# Patient Record
Sex: FEMALE | Race: WHITE | Employment: OTHER | URBAN - METROPOLITAN AREA
[De-identification: names, ages, dates, MRNs, and addresses within clinical notes are randomized per-mention and may not be internally consistent; named-entity substitution may affect disease eponyms.]

---

## 2017-01-03 PROCEDURE — 87086 URINE CULTURE/COLONY COUNT: CPT | Performed by: INTERNAL MEDICINE

## 2017-01-12 ENCOUNTER — HOSPITAL ENCOUNTER (EMERGENCY)
Facility: HOSPITAL | Age: 82
Discharge: HOME | End: 2017-01-12
Attending: EMERGENCY MEDICINE
Payer: MEDICARE

## 2017-01-12 ENCOUNTER — APPOINTMENT (OUTPATIENT)
Dept: GENERAL RADIOLOGY | Facility: HOSPITAL | Age: 82
End: 2017-01-12
Attending: EMERGENCY MEDICINE
Payer: MEDICARE

## 2017-01-12 VITALS
HEART RATE: 56 BPM | TEMPERATURE: 98 F | WEIGHT: 170 LBS | BODY MASS INDEX: 28.32 KG/M2 | RESPIRATION RATE: 17 BRPM | HEIGHT: 65 IN | OXYGEN SATURATION: 97 %
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
## 2017-01-12 PROCEDURE — 99283 EMERGENCY DEPT VISIT LOW MDM: CPT

## 2017-01-12 PROCEDURE — 72170 X-RAY EXAM OF PELVIS: CPT

## 2017-01-12 NOTE — ED INITIAL ASSESSMENT (HPI)
Patient here for fall while caregiver was assisting patient from car to wheelchair. Both caregiver and patient slid down. Patient denies any pain. No head injury, no loc.

## 2017-04-18 ENCOUNTER — HOSPITAL ENCOUNTER (EMERGENCY)
Facility: HOSPITAL | Age: 82
Discharge: HOME | End: 2017-04-18
Attending: EMERGENCY MEDICINE
Payer: MEDICARE

## 2017-04-18 VITALS
DIASTOLIC BLOOD PRESSURE: 77 MMHG | HEART RATE: 63 BPM | HEIGHT: 64 IN | BODY MASS INDEX: 29.02 KG/M2 | SYSTOLIC BLOOD PRESSURE: 128 MMHG | RESPIRATION RATE: 16 BRPM | TEMPERATURE: 97 F | WEIGHT: 170 LBS | OXYGEN SATURATION: 97 %

## 2017-04-18 DIAGNOSIS — R63.0 ANOREXIA: ICD-10-CM

## 2017-04-18 DIAGNOSIS — F32.A DEPRESSION, UNSPECIFIED DEPRESSION TYPE: Primary | ICD-10-CM

## 2017-04-18 PROCEDURE — 80048 BASIC METABOLIC PNL TOTAL CA: CPT | Performed by: EMERGENCY MEDICINE

## 2017-04-18 PROCEDURE — 99285 EMERGENCY DEPT VISIT HI MDM: CPT

## 2017-04-18 PROCEDURE — 87086 URINE CULTURE/COLONY COUNT: CPT | Performed by: EMERGENCY MEDICINE

## 2017-04-18 PROCEDURE — 81001 URINALYSIS AUTO W/SCOPE: CPT | Performed by: EMERGENCY MEDICINE

## 2017-04-18 PROCEDURE — 36415 COLL VENOUS BLD VENIPUNCTURE: CPT

## 2017-04-18 PROCEDURE — 85025 COMPLETE CBC W/AUTO DIFF WBC: CPT | Performed by: EMERGENCY MEDICINE

## 2017-04-18 NOTE — ED PROVIDER NOTES
Patient Seen in: [facility] AND [facility] Emergency Department    History   Patient presents with:  Carmencita (psychiatric)    Stated Complaint: psych    HPI    Patient presents with power of  for her health care.   She has history of bipolar disorder as we daily.   nystatin-triamcinolone (MYCOLOG II) 463371-9.1 UNIT/GM-% Apply Externally Cream,  Apply 1 Application topically 2 (two) times daily as needed.    acetaminophen (TYLENOL) 325 MG Oral Tab,  Take 2 tablets by mouth every 6 (six) hours as needed for Pa auscultation bilaterally with good effort. No wheezes, ronchi, or rales. Abdomen:  Soft, non-tender, non-distended. No masses, no hepato-splenomegaly. Negative McBurney's point tenderness. Musculoskeletal:  Good muscle tone.   Skin:  Warm, dry, well pe Final result                 Please view results for these tests on the individual orders.    RAINBOW DRAW BLUE   RAINBOW DRAW GOLD   RAINBOW DRAW LAVENDER   RAINBOW DRAW LIGHT GREEN   RAINBOW DRAW DARK GREEN   RAINBOW DRAW LAVENDER TALL (BNP)   URINE CULTU

## 2017-04-18 NOTE — ED INITIAL ASSESSMENT (HPI)
Pt is refusing to take medications, eat, or take care of herself. Lives at home, has 24 hour caregiver at home, becomes agitated and will not comply with caregivers.

## 2017-04-18 NOTE — DISCHARGE PLANNING
Met with patient (sleeping) and POA at bedside regarding discharge planning. Patient has been living at home with 24 hour caregivers and has been declining at home.  POA states she was recently discharged from [facility] and was supposed to start PT/

## 2017-04-18 NOTE — ED NOTES
Pt becoming increasingly agitated, needing to be redirected multiple times, awaiting POA return and update from  Luis Angela

## 2017-04-19 NOTE — DISCHARGE PLANNING
Spoke with Noamn Arizmendi at [facility] and they are able to accept the patient tonight at [address] . RN to call [phone] to give report. Ambulance arranged due to patient's confused state, unable to sit unsupervised. [facility] ambulance arranged with ETA 30 minutes.  V

## 2017-04-19 NOTE — DISCHARGE PLANNING
[facility] insurance today due to not being able to obtain authorization.  Discussed with [facility] that [facility]  may be able to accept the patient under private pay until authorization can be obtained th

## 2017-04-29 ENCOUNTER — APPOINTMENT (OUTPATIENT)
Dept: CT IMAGING | Facility: HOSPITAL | Age: 82
DRG: 690 | End: 2017-04-29
Attending: EMERGENCY MEDICINE
Payer: MEDICARE

## 2017-04-29 ENCOUNTER — APPOINTMENT (OUTPATIENT)
Dept: GENERAL RADIOLOGY | Facility: HOSPITAL | Age: 82
DRG: 690 | End: 2017-04-29
Attending: EMERGENCY MEDICINE
Payer: MEDICARE

## 2017-04-29 ENCOUNTER — HOSPITAL ENCOUNTER (INPATIENT)
Facility: HOSPITAL | Age: 82
LOS: 6 days | Discharge: NURSING, REHAB OR LONG-TERM CARE | DRG: 690 | End: 2017-05-05
Attending: EMERGENCY MEDICINE | Admitting: INTERNAL MEDICINE
Payer: MEDICARE

## 2017-04-29 DIAGNOSIS — IMO0002 SELF-INFLICTED INJURY: ICD-10-CM

## 2017-04-29 DIAGNOSIS — F32.A DEPRESSION, UNSPECIFIED DEPRESSION TYPE: ICD-10-CM

## 2017-04-29 DIAGNOSIS — N39.0 URINARY TRACT INFECTION WITHOUT HEMATURIA, SITE UNSPECIFIED: Primary | ICD-10-CM

## 2017-04-29 PROCEDURE — 73030 X-RAY EXAM OF SHOULDER: CPT

## 2017-04-29 PROCEDURE — 70450 CT HEAD/BRAIN W/O DYE: CPT

## 2017-04-30 PROCEDURE — 90792 PSYCH DIAG EVAL W/MED SRVCS: CPT | Performed by: OTHER

## 2017-04-30 RX ORDER — CASTOR OIL AND BALSAM, PERU 788; 87 MG/G; MG/G
OINTMENT TOPICAL 2 TIMES DAILY PRN
Status: DISCONTINUED | OUTPATIENT
Start: 2017-04-30 | End: 2017-05-06

## 2017-04-30 RX ORDER — LAMOTRIGINE 25 MG/1
100 TABLET ORAL 2 TIMES DAILY
Status: DISCONTINUED | OUTPATIENT
Start: 2017-04-30 | End: 2017-05-06

## 2017-04-30 RX ORDER — SODIUM CHLORIDE 9 MG/ML
INJECTION, SOLUTION INTRAVENOUS CONTINUOUS
Status: DISCONTINUED | OUTPATIENT
Start: 2017-04-30 | End: 2017-05-02

## 2017-04-30 RX ORDER — CLONAZEPAM 0.5 MG/1
0.5 TABLET ORAL 3 TIMES DAILY PRN
Status: DISCONTINUED | OUTPATIENT
Start: 2017-04-30 | End: 2017-05-01

## 2017-04-30 RX ORDER — 0.9 % SODIUM CHLORIDE 0.9 %
VIAL (ML) INJECTION
Status: COMPLETED
Start: 2017-04-30 | End: 2017-04-30

## 2017-04-30 RX ORDER — ASPIRIN 81 MG/1
81 TABLET, CHEWABLE ORAL DAILY
Status: DISCONTINUED | OUTPATIENT
Start: 2017-04-30 | End: 2017-05-06

## 2017-04-30 RX ORDER — GABAPENTIN 300 MG/1
300 CAPSULE ORAL DAILY
Status: DISCONTINUED | OUTPATIENT
Start: 2017-04-30 | End: 2017-04-30

## 2017-04-30 RX ORDER — LISINOPRIL 40 MG/1
40 TABLET ORAL DAILY
Status: DISCONTINUED | OUTPATIENT
Start: 2017-04-30 | End: 2017-05-06

## 2017-04-30 RX ORDER — CLONAZEPAM 0.5 MG/1
0.5 TABLET ORAL 2 TIMES DAILY
Status: DISCONTINUED | OUTPATIENT
Start: 2017-04-30 | End: 2017-04-30

## 2017-04-30 RX ORDER — ENOXAPARIN SODIUM 100 MG/ML
40 INJECTION SUBCUTANEOUS DAILY
Status: DISCONTINUED | OUTPATIENT
Start: 2017-04-30 | End: 2017-05-06

## 2017-04-30 RX ORDER — GINSENG 100 MG
CAPSULE ORAL DAILY
Status: DISCONTINUED | OUTPATIENT
Start: 2017-04-30 | End: 2017-05-06

## 2017-04-30 RX ORDER — ACETAMINOPHEN 325 MG/1
650 TABLET ORAL EVERY 6 HOURS PRN
Status: DISCONTINUED | OUTPATIENT
Start: 2017-04-30 | End: 2017-05-06

## 2017-04-30 RX ORDER — RISPERIDONE 1 MG/1
1 TABLET, FILM COATED ORAL DAILY
Status: DISCONTINUED | OUTPATIENT
Start: 2017-04-30 | End: 2017-05-03

## 2017-04-30 RX ORDER — ATORVASTATIN CALCIUM 20 MG/1
10 TABLET, FILM COATED ORAL NIGHTLY
Status: DISCONTINUED | OUTPATIENT
Start: 2017-04-30 | End: 2017-05-06

## 2017-04-30 RX ORDER — PAROXETINE HYDROCHLORIDE 20 MG/1
10 TABLET, FILM COATED ORAL EVERY MORNING
Status: DISCONTINUED | OUTPATIENT
Start: 2017-04-30 | End: 2017-05-01

## 2017-04-30 RX ORDER — AMLODIPINE BESYLATE 5 MG/1
5 TABLET ORAL DAILY
Status: DISCONTINUED | OUTPATIENT
Start: 2017-04-30 | End: 2017-05-06

## 2017-04-30 NOTE — ED INITIAL ASSESSMENT (HPI)
Pt presents from [facility] for fall out of wheelchair approx 16:30. Pt with hematoma noted to right side of head. Per NH pt had been purposely trying to fall out, pt states \" I did it on purpose. \"   Baseline A&O x2.

## 2017-04-30 NOTE — DISCHARGE PLANNING
5/30/17  Discharge planning   Spoke with son Elyssa Bass [phone]. Pt was admitted from [facility] after fall from wheel chair. Son is requesting transfer to [facility] and is considering long term NH placement.   Referral made to [facility]

## 2017-04-30 NOTE — H&P
[address] Patient Status:  Inpatient    1928 MRN [id number]   Location [facility] 5SW/SE Attending Ludivina Bell MD   Hosp Day # 1 PCP Katalina Gamboa MD     Date:  2017 has a history of getting angry like this and has had outbursts in the past where she has tried injuring herself to get her way.     History     Past Medical History   Diagnosis Date   • Hyperlipidemia    • HTN (hypertension)    • Paroxysmal atrial fibrillat mouth daily. Indications: Nutritional supplement   aspirin 81 MG Oral Chew Tab Chew 81 mg by mouth daily.  Indications: Cardiac Health   LISINOPRIL 20 MG Oral Tab TAKE ONE TABLET BY MOUTH TWICE DAILY   Ciprofloxacin HCl 250 MG Oral Tab Take 1 tablet (250 mg 27 04/29/2017   * 04/29/2017   CA 9.4 04/29/2017   ALB 3.7 08/05/2016   ALKPHO 81 08/05/2016   BILT 0.3 08/05/2016   TP 7.1 08/05/2016   AST 12* 08/05/2016   ALT 19 08/05/2016   INR 2.09* 12/30/2013   PT 23.2* 12/30/2013   TSH 2.150 08/05/2016   ETO

## 2017-04-30 NOTE — ED PROVIDER NOTES
Patient Seen in: [facility] AND [facility] Emergency Department    History   Patient presents with:  Contusion (musculoskeletal)    Stated Complaint: fall, possible psych    HPI    71-year-old female with history of hypertension, hyperlipidemia, paroxysmal atria (MYCOLOG II) 403268-8.3 UNIT/GM-% Apply Externally Cream,  Apply 1 Application topically 2 (two) times daily as needed.    acetaminophen (TYLENOL) 325 MG Oral Tab,  Take 2 tablets by mouth every 6 (six) hours as needed for Pain.   lamoTRIgine (LAMICTAL) 150 external or internal trauma by exam.  Neurological: Speech normal.  Cranial nerves II through XII intact. No focal motor or sensory deficits to extremities ×4. Skin: No laceration or abrasions.   Musculoskeletal                Head: Contusion and tenderne CBC WITH DIFFERENTIAL WITH PLATELET    Narrative: The following orders were created for panel order CBC WITH DIFFERENTIAL WITH PLATELET.   Procedure                               Abnormality         Status                     ---------

## 2017-04-30 NOTE — PLAN OF CARE
Problem: Patient/Family Goals  Goal: Patient/Family Long Term Goal  Patient’s Long Term Goal: To be discharged to appropriate level of care  Interventions:  - SW involved  - Psych eval  - Follow up with MD as ordered  - See additional Care Plan goals for s Not Progressing    Problem: SAFETY ADULT - FALL  Goal: Free from fall injury  INTERVENTIONS:  - Assess pt frequently for physical needs  - Identify cognitive and physical deficits and behaviors that affect risk of falls.   - [place] fall precautions as in

## 2017-04-30 NOTE — PHYSICAL THERAPY NOTE
PHYSICAL THERAPY EVALUATION - INPATIENT     Room Number: 563/563-A  Evaluation Date: 4/30/2017  Type of Evaluation: Initial  Physician Order: PT Eval and Treat    Presenting Problem: UTI  Reason for Therapy: Mobility Dysfunction and Discharge Planning behavioral disturbance, unspecified dementia type    Paroxysmal atrial fibrillation (HCC)    Urinary tract infection without hematuria    Self-inflicted injury    Depression, unspecified depression type      Past Medical History  Past Medical History   Jyothi Fair -  Static Standing: Poor +  Dynamic Standing: Poor    ACTIVITY TOLERANCE  Room air    AM-PAC '6-Clicks' INPATIENT SHORT FORM - BASIC MOBILITY  How much difficulty does the patient currently have. ..  -   Turning over in bed (including adjusting bedclot

## 2017-05-01 PROCEDURE — 99233 SBSQ HOSP IP/OBS HIGH 50: CPT | Performed by: OTHER

## 2017-05-01 RX ORDER — PAROXETINE 10 MG/1
5 TABLET, FILM COATED ORAL EVERY MORNING
Status: DISCONTINUED | OUTPATIENT
Start: 2017-05-02 | End: 2017-05-03

## 2017-05-01 RX ORDER — CLONAZEPAM 0.5 MG/1
0.25 TABLET ORAL 3 TIMES DAILY PRN
Status: DISCONTINUED | OUTPATIENT
Start: 2017-05-01 | End: 2017-05-06

## 2017-05-01 RX ORDER — ARIPIPRAZOLE 2 MG/1
1 TABLET ORAL 2 TIMES DAILY
Status: DISCONTINUED | OUTPATIENT
Start: 2017-05-01 | End: 2017-05-03

## 2017-05-01 NOTE — PLAN OF CARE
Problem: Patient/Family Goals  Goal: Patient/Family Long Term Goal  Patient’s Long Term Goal: To be discharged to appropriate level of care  Interventions:  - SW involved  - Psych eval  - Follow up with MD as ordered  - See additional Care Plan goals for s development  - Assess and document skin integrity  - Assess and document dressing/incision, wound bed, drain sites and surrounding tissue  - Implement wound care per orders  - Initiate isolation precautions as appropriate  - Initiate Pressure Ulcer prevent rounds. Patient updated on plan of care.

## 2017-05-01 NOTE — PROGRESS NOTES
[facility]    Progress Note    Shivani Kirby Patient Status:  Inpatient    1928 MRN [id number]   Location [facility] 5SW/SE Attending Riki Lozada MD   Hosp Day # 2 PCP Jared Schwartz MD       Subjective:     Claudia Kingston 04/29/2017       Xr Shoulder, Complete (min 2 Views), Right (cpt=[id number])    4/30/2017  CONCLUSION:  1. No acute fracture. 2. 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 No major discrepancy with preliminary [facility] radiology report.         Ct

## 2017-05-01 NOTE — DISCHARGE PLANNING
Updated clinicals, including psych consult sent to [facility] admissions.     CARROLL ordoñez [id number]

## 2017-05-01 NOTE — CONSULTS
[facility]    Report of Consultation    Valeria Johnnylety Patient Status:  Inpatient    1928 MRN [id number]   Location [facility] 5SW/SE Attending Valerie Law MD   Hosp Day # 2 PCP Celio Romo MD     Date of Admi otherwise has been on Paxil, risperidone, Klonopin and lamotrigine. The patient today admitted that she has been exhibiting memory problem and she believed that she is currently in [address].   Patient otherwise had brain CT scan showing small vessel isc lamoTRIgine (LAMICTAL) tab 100 mg 100 mg Oral BID   PARoxetine HCl (PAXIL) tab 10 mg 10 mg Oral QAM   risperiDONE (RISPERDAL) tab 1 mg 1 mg Oral Daily   Atorvastatin Calcium (LIPITOR) tab 10 mg 10 mg Oral Nightly   lisinopril (PRINIVIL,ZESTRIL) tab 40 mg  04/29/2017   CREATSERUM 1.08 04/29/2017   BUN 18 04/29/2017    04/29/2017   K 4.0 04/29/2017    04/29/2017   CO2 27 04/29/2017   * 04/29/2017   CA 9.4 04/29/2017   ALB 3.7 08/05/2016   ALKPHO 81 08/05/2016   TP 7.1 08/05/2016 bipolar disorder and history of dementia. Patient also has a history of previous self-inflicted behavior. Patient admitted in 2013 because she made herself fell from wheelchair and broke her hip.   Patient clearly has been exhibiting instability in her em

## 2017-05-01 NOTE — PLAN OF CARE
Problem: Patient/Family Goals  Goal: Patient/Family Long Term Goal  Patient’s Long Term Goal: To be discharged to appropriate level of care  Interventions:  - SW involved  - Psych eval  - Follow up with MD as ordered  - See additional Care Plan goals for s Progressing    Problem: SAFETY ADULT - FALL  Goal: Free from fall injury  INTERVENTIONS:  - Assess pt frequently for physical needs  - Identify cognitive and physical deficits and behaviors that affect risk of falls.   - [place] fall precautions as indica

## 2017-05-01 NOTE — PAYOR COMM NOTE
Admit Orders     Start     Ordered    04/30/17 0009  Admit to inpatient Once -  ([address])   Once     Ordering Provider:  Alberto Forte MD   Question Answer Comment   Diagnosis Urinary tract infection without hematuria, site unspecified

## 2017-05-01 NOTE — PAYOR COMM NOTE
Attending Physician: Nuha Mireles MD    Review Type: ADMISSION   Reviewer:  Laurie Olvera       Date: May 1, 2017 - 2:08 PM  Payor: [address] [place] [address] Number: [id number]  Admit date: 4/29/2017  8:36 PM   Admitted from [facility]      HIP REPLACEMENT SURGERY      MASTECTOMY LEFT      EXPLORATORY OF ABDOMEN      TREAT ECTOPIC PREG,NON REMVAL         Medications :   SIMVASTATIN 20 MG Oral Tab,  TAKE 1 TABLET BY MOUTH AT BEDTIME   gabapentin 300 MG Oral Cap,  Take 1 caps 2046 18   Temp 04/29/17 2046 97.4 °F (36.3 °C)   Temp src 04/29/17 2046 Temporal   SpO2 04/29/17 2046 98 %   O2 Device 04/29/17 2046 None (Room air)       Current:/51 mmHg  Pulse 61  Temp(Src) 97.4 °F (36.3 °C) (Temporal)  Resp 18  Ht 165.1 cm (5' 5\ for the following:     Clarity Urine Cloudy (*)     Protein Urine 30  (*)     Blood Urine Moderate (*)     Nitrite Urine Positive (*)     Urobilinogen Urine 2.0 (*)     Leukocyte Esterase Urine Large (*)     WBC Urine 436 (*)     RBC URINE 65 (*)     Bacte unspecified  (primary encounter diagnosis)  Self-inflicted injury  Depression, unspecified depression type    Disposition:  Admit    Follow-up:  No follow-up provider specified.     Medications Prescribed:  Current Discharge Medication List        Present o and he reports she recently had her medications changed. He states that the pt demands her own private caregivers. He states the pt needs to be the center of attention. Jose M Prado has been the [facility] for the last 4 years.  He notes a history of mood swings over th MOUTH AT BEDTIME   gabapentin 300 MG Oral Cap Take 1 capsule (300 mg total) by mouth daily. PARoxetine HCl 10 MG Oral Tab Take 10 mg by mouth every morning. risperiDONE 1 MG Oral Tab Take 1 mg by mouth daily.    lisinopril 20 MG Oral Tab Take 40 mg by m (36.4 °C) (Oral)  Resp 18  Ht 5' 5\" (1.651 m)  Wt 171 lb 9.6 oz (77.837 kg)  BMI 28.56 kg/m2  SpO2 99%     GENERAL: Well-appearing, no apparent distress   SKIN: Multiple ulcerations of the bilateral lower extremities   HEENT: Atraumatic, normocephalic, th ceftriaxone for UTI  - cont risperidone, lamictal, and klonopin prn  - psych consulted for adjustment of antipsychotics   - spoke with Osmin EM. He would like pt to be discharged to a permanent nursing home.  She will be transferring care to new PCP,

## 2017-05-01 NOTE — OCCUPATIONAL THERAPY NOTE
OCCUPATIONAL THERAPY EVALUATION - INPATIENT     Room Number: 563/563-A  Evaluation Date: 5/1/2017  Type of Evaluation: Initial  Presenting Problem: UTI, fall from wheelchair.     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

## 2017-05-02 RX ORDER — AMLODIPINE BESYLATE 2.5 MG/1
2.5 TABLET ORAL ONCE
Status: COMPLETED | OUTPATIENT
Start: 2017-05-02 | End: 2017-05-02

## 2017-05-02 RX ORDER — FLUCONAZOLE 100 MG/1
150 TABLET ORAL ONCE
Status: COMPLETED | OUTPATIENT
Start: 2017-05-02 | End: 2017-05-02

## 2017-05-02 RX ORDER — CIPROFLOXACIN 500 MG/1
500 TABLET, FILM COATED ORAL EVERY 12 HOURS SCHEDULED
Status: DISCONTINUED | OUTPATIENT
Start: 2017-05-02 | End: 2017-05-05

## 2017-05-02 NOTE — DISCHARGE PLANNING
3:12pm Update-LIT spoke with the [facility] who states that the [facility] snf is denying the case. They do not feel they can accommodate the pt's behaviors.  LIT spoke with the primary contact [facility] who states that he is agreeable for

## 2017-05-02 NOTE — PLAN OF CARE
Problem: Patient/Family Goals  Goal: Patient/Family Long Term Goal  Patient’s Long Term Goal: To be discharged to appropriate level of care  Interventions:  - SW involved  - Psych eval  - Follow up with MD as ordered  - See additional Care Plan goals for s Progressing    Problem: SAFETY ADULT - FALL  Goal: Free from fall injury  INTERVENTIONS:  - Assess pt frequently for physical needs  - Identify cognitive and physical deficits and behaviors that affect risk of falls.   - [place] fall precautions as indica

## 2017-05-02 NOTE — PROGRESS NOTES
[facility]    Progress Note    Diamante Trejo Patient Status:  Inpatient    1928 MRN [id number]   Location [facility] 5SW/SE Attending Elsa Keane MD   Hosp Day # 3 PCP Jone Winter MD       Subjective:     Humera Jiang

## 2017-05-02 NOTE — PROGRESS NOTES
Diamante Trejo is a 80year old  female with a chronic history of hypertension, A. fib, bipolar disorder and dementia who currently lives in [place] [facility] who inflicted self fall and he presented to the hospital to discover UTI.   The patient seen tobulmaro 3/4 a day    Alcohol Use: No                 Medications (Active prior to today's visit):    Current Facility-Administered Medications:  ARIPiprazole (ABILIFY) tab 1 mg 1 mg Oral BID   ClonazePAM (KLONOPIN) tab 0.25 mg 0.25 mg Oral TID PRN   [START ON 5/2/ vessel disease in cerebral white matter. 3. Age related cerebral atrophy. 4. Intracranial atherosclerosis right vertebral artery and bilateral cavernous carotid arteries. 5. Right frontotemporal scalp hematoma/ contusion.       No major discrepancy with pre Placed This Encounter  Basic Metabolic Panel (8)  Ethyl Alcohol  CBC With Differential With Platelet  Drug Screen 7 W/confirmation, urine Once  Urinalysis with Culture Reflex STAT  Urine Culture, Routine Once  Emergency MRSA Screen by PCR STAT  Emergency M

## 2017-05-02 NOTE — PLAN OF CARE
Problem: Patient/Family Goals  Goal: Patient/Family Long Term Goal  Patient’s Long Term Goal: To be discharged to appropriate level of care  Interventions:  - SW involved  - Psych eval  - Follow up with MD as ordered  - See additional Care Plan goals for s Progressing    Problem: SAFETY ADULT - FALL  Goal: Free from fall injury  INTERVENTIONS:  - Assess pt frequently for physical needs  - Identify cognitive and physical deficits and behaviors that affect risk of falls.   - [place] fall precautions as indica

## 2017-05-03 PROCEDURE — 99233 SBSQ HOSP IP/OBS HIGH 50: CPT | Performed by: OTHER

## 2017-05-03 RX ORDER — ARIPIPRAZOLE 5 MG/1
2.5 TABLET ORAL NIGHTLY
Status: DISCONTINUED | OUTPATIENT
Start: 2017-05-03 | End: 2017-05-06

## 2017-05-03 RX ORDER — RISPERIDONE 0.5 MG/1
0.5 TABLET, FILM COATED ORAL DAILY
Status: DISCONTINUED | OUTPATIENT
Start: 2017-05-04 | End: 2017-05-06

## 2017-05-03 NOTE — PLAN OF CARE
Problem: Patient/Family Goals  Goal: Patient/Family Long Term Goal  Patient’s Long Term Goal: To be discharged to appropriate level of care  Interventions:  - SW involved  - Psych eval  - Follow up with MD as ordered  - See additional Care Plan goals for s Progressing    Problem: SAFETY ADULT - FALL  Goal: Free from fall injury  INTERVENTIONS:  - Assess pt frequently for physical needs  - Identify cognitive and physical deficits and behaviors that affect risk of falls.   - [place] fall precautions as indica

## 2017-05-03 NOTE — PLAN OF CARE
Problem: Patient/Family Goals  Goal: Patient/Family Long Term Goal  Patient’s Long Term Goal: To be discharged to appropriate level of care  Interventions:  - SW involved  - Psych eval  - Follow up with MD as ordered  - See additional Care Plan goals for s assessment.  - Educate pt/family on patient safety including physical limitations  - Instruct pt to call for assistance with activity based on assessment  - Modify environment to reduce risk of injury  - Provide assistive devices as appropriate  - Consider

## 2017-05-04 NOTE — PLAN OF CARE
Problem: Patient/Family Goals  Goal: Patient/Family Long Term Goal  Patient’s Long Term Goal: To be discharged to appropriate level of care  Interventions:  - SW involved  - Psych eval  - Follow up with MD as ordered  - See additional Care Plan goals for s Progressing    Problem: SAFETY ADULT - FALL  Goal: Free from fall injury  INTERVENTIONS:  - Assess pt frequently for physical needs  - Identify cognitive and physical deficits and behaviors that affect risk of falls.   - [place] fall precautions as indica

## 2017-05-04 NOTE — PROGRESS NOTES
[facility]    Progress Note    Manjit Blanc Patient Status:  Inpatient    1928 MRN [id number]   Location [facility] 5SW/SE Attending Marissa Bliss MD   Hosp Day # 5 PCP Rosa Maria Lopez MD       Subjective:     Sravani Robb

## 2017-05-04 NOTE — OCCUPATIONAL THERAPY NOTE
OCCUPATIONAL THERAPY TREATMENT NOTE - INPATIENT     Room Number: 563/563-A    Presenting Problem: UTI, fall from wheelchair.     Problem List  Principal Problem:    Urinary tract infection without hematuria, site unspecified  Active Problems:    Bipolar 1 d care of personal grooming such as brushing teeth?: A Lot  -   Eating meals?: A Little    AM-PAC Score:  Score: 13  Approx Degree of Impairment: 63.03%  Standardized Score (AM-PAC Scale): 32.03  CMS Modifier (G-Code): CL    FUNCTIONAL TRANSFER ASSESSMENT  S

## 2017-05-04 NOTE — DISCHARGE PLANNING
[facility] is still reviewing the case. Pt has been denied by several of their locations.  SW initiated referrals to the following snf options with memory care: [facility] [address], [address]

## 2017-05-04 NOTE — PLAN OF CARE
Problem: NEUROLOGICAL - ADULT  Goal: Achieves stable or improved neurological status  INTERVENTIONS  - Assess for and report changes in neurological status  - Initiate measures to prevent increased intracranial pressure  - Maintain blood pressure and fluid strengthening/mobility  - Encourage toileting schedule   Outcome: Progressing    Problem: Patient Centered Care  Goal: Patient preferences are identified and integrated in the patient’s plan of care  Interventions:  - What would you like us to know as we c

## 2017-05-05 VITALS
RESPIRATION RATE: 16 BRPM | DIASTOLIC BLOOD PRESSURE: 64 MMHG | BODY MASS INDEX: 28.6 KG/M2 | OXYGEN SATURATION: 95 % | TEMPERATURE: 98 F | SYSTOLIC BLOOD PRESSURE: 131 MMHG | HEIGHT: 65 IN | HEART RATE: 62 BPM | WEIGHT: 171.63 LBS

## 2017-05-05 PROCEDURE — 99232 SBSQ HOSP IP/OBS MODERATE 35: CPT | Performed by: OTHER

## 2017-05-05 RX ORDER — RISPERIDONE 0.5 MG/1
0.5 TABLET, FILM COATED ORAL DAILY
Qty: 30 TABLET | Refills: 5 | Status: SHIPPED | OUTPATIENT
Start: 2017-05-05

## 2017-05-05 RX ORDER — AMLODIPINE BESYLATE 5 MG/1
5 TABLET ORAL DAILY
Qty: 30 TABLET | Refills: 6 | Status: SHIPPED | OUTPATIENT
Start: 2017-05-05

## 2017-05-05 RX ORDER — LISINOPRIL 40 MG/1
40 TABLET ORAL DAILY
Qty: 30 TABLET | Refills: 6 | Status: SHIPPED | OUTPATIENT
Start: 2017-05-05

## 2017-05-05 RX ORDER — CLONAZEPAM 0.5 MG/1
0.25 TABLET ORAL 3 TIMES DAILY PRN
Qty: 90 TABLET | Refills: 0 | Status: SHIPPED | OUTPATIENT
Start: 2017-05-05

## 2017-05-05 RX ORDER — ARIPIPRAZOLE 5 MG/1
2.5 TABLET ORAL NIGHTLY
Qty: 30 TABLET | Refills: 5 | Status: SHIPPED | OUTPATIENT
Start: 2017-05-05 | End: 2017-06-23

## 2017-05-05 NOTE — DISCHARGE SUMMARY
[facility]    Discharge Summary    Paulo Arriaza Patient Status:  Inpatient    1928 MRN [id number]   Location [facility] 5SW/SE Attending Avi Kelly MD   Hosp Day # 6 PCP Logan Gao MD     Date of Admission: 4 stay at [facility] she and her caregiver requested an alternate. She was accepted at [facility] and will be transferred there.       Complications: None    Consultants     Provider Role    Roxana Miguel MD Consulting Physician     Sara Ramesh

## 2017-05-05 NOTE — PLAN OF CARE
Problem: Patient/Family Goals  Goal: Patient/Family Long Term Goal  Patient’s Long Term Goal: To be discharged to appropriate level of care  Interventions:  - SW involved  - Psych eval  - Follow up with MD as ordered  - See additional Care Plan goals for s Progressing  Prompt incontinence care provided. Problem: SAFETY ADULT - FALL  Goal: Free from fall injury  INTERVENTIONS:  - Assess pt frequently for physical needs  - Identify cognitive and physical deficits and behaviors that affect risk of falls.   -

## 2017-05-05 NOTE — DISCHARGE PLANNING
Pt has been accepted at [facility] short term rehab, to be followed by private pay services. The PAS screen is pending to [facility] Dept, awaiting their eta. Pt cannot dc to snf until the PAS screen is completed.     symone wade,CARROLL x

## 2017-05-05 NOTE — PROGRESS NOTES
Subjective     Patient reports no complaints. She continues to feel quite depressed.     Objective    Vital signs reviewed  Chest is clear  Cardiovascular normal S1-S2 with grade 2 systolic murmur right upper sternal border and apex  Abdomen soft no tender

## 2017-05-05 NOTE — DISCHARGE PLANNING
SW received call from the [facility]/Eliane that they will complete the PAS screen today at [address] spoke with [facility] liaison who confirmed the pt is able to admit to the snf today after the screen is completed.  Pt is not safe t

## 2017-05-05 NOTE — DISCHARGE PLANNING
Patient has been medically cleared by Dr. Lena Redmond and Dr. Shayla Jin to be discharged; order to discharge acknowledged; LIT updated RN, patient to go to [facility], pending visit from [facility] rep today at 5pm for PAS screen, as of this time,

## 2017-05-06 NOTE — PROGRESS NOTES
Elizabeth Mckeon is a 80year old  female with a chronic history of hypertension, A. fib, bipolar disorder and dementia who currently lives in [place] who inflicted self fall and he presented to the hospital to discover UTI.   The patient seen toda current facility-administered medications for this encounter. Allergies:  No Known Allergies    Laboratory Data:    Lab Results  Component Value Date   WBC 12.1* 04/29/2017   HGB 12.4 04/29/2017   HCT 38.4 04/29/2017    04/29/2017   CREATSERUM 1. twice daily as needed for anxiety. 5.  Continue risperidone 0.5 mg every morning.       Orders This Visit:    Orders Placed This Encounter  Basic Metabolic Panel (8)  Ethyl Alcohol  CBC With Differential With Platelet  Drug Screen 7 W/confirmation, urine O

## (undated) NOTE — ED AVS SNAPSHOT
[facility] Emergency Department    [address]     [place] [address]    Phone:  [phone]    Fax:  [phone]   MRN: [id number]    Department:  [facility] Emergency Department   Date of Visit:  1/12/ and Class Registration line at [phone] or find a doctor online by visiting [email or web address].    IF THERE IS ANY CHANGE OR WORSENING OF YOUR CONDITION, CALL YOUR PRIMARY CARE PHYSICIAN AT ONCE OR RETURN IMMEDIATELY TO [address] [place] [address].     If

## (undated) NOTE — ED AVS SNAPSHOT
[facility] Emergency Department    [address]iel [address]     [place] [address]    Phone:  [phone]    Fax:  [phone] [facility]   MRN: [id number]    Department:  [facility] Emergency Department   Date of Visit:  4/18/ visit does not uncover every injury or illness.  If you have been referred to a primary care or a specialist physician for a follow-up visit, please tell this physician (or your personal doctor if your instructions are to return to your personal doctor) abo [address] [facility] [address] ([address]) [phone]   [facility] [employer] ([address]) 300 [facility] [employer] ([address] [place] [address] Avenue,[address] Floor) [facility] [address]  [place]yn

## (undated) NOTE — ED AVS SNAPSHOT
[facility] Emergency Department    [address]     [place] [address]    Phone:  [phone]    Fax:  [phone] [facility]   MRN: [id number]    Department:  [facility] Emergency Department   Date of Visit:  4/18/ and Class Registration line at [phone] or find a doctor online by visiting [email or web address].    IF THERE IS ANY CHANGE OR WORSENING OF YOUR CONDITION, CALL YOUR PRIMARY CARE PHYSICIAN AT ONCE OR RETURN IMMEDIATELY TO [address] [place] [address].     If

## (undated) NOTE — ED AVS SNAPSHOT
[facility] Emergency Department    [address]     [place] [address]    Phone:  [phone]    Fax:  [phone] [facility]   MRN: [id number]    Department:  [facility] Emergency Department   Date of Visit:  1/12/ If you have difficulty scheduling your follow-up appointment as directed, please call our  at [phone]. Si tiene problemas para programar rashmi martha de seguimiento según lo indicado, llame al encargado de rey al [phone].     It i continue to take your medications as instructed by your Primary Care doctor until you can check with your doctor. Please bring the medication list to your next doctor's appointment.     Any imaging studies and labs completed today can be reviewed in your M

## (undated) NOTE — IP AVS SNAPSHOT
[address]  602 [facility], Janice Duyen ~ [phone]                Discharge Summary   2017    [facility] [place]           Admission Information        Provider Department    2017 Sarah Cervantes MD [facility] 5sw Commonly known as:  Jeff Arreguin   What changed:    - how much to take  - reasons to take this        Take 0.5 tablets (0.25 mg total) by mouth 3 (three) times daily as needed.     Josephine Guerin                           lisinopril 40 MG Tabs   Last time th Dc Danielle                           TYLENOL 325 MG Tabs   Last time this was given:  650 mg on 5/4/2017  2:26 PM   Generic drug:  acetaminophen        Take 2 tablets by mouth every 6 (six) hours as needed for Pain.     Dc Danielle Neutrophil % Lymphocyte % Monocyte % Eosinophil % Basophil % Prelim Neut Abs Final Neut Abs Lymphocyte Abso Monocyte Absolu Eosinophil Abso Basophil Absolu    (04/29/17)  75 (04/29/17)  12 (04/29/17)  9 (04/29/17)  3 (04/29/17)  1 -- (04/29/17)  9.1 (H) ( Call the [employer] for assistance with your inactive IES account    If you have questions, you can call [phone] to talk to our [facility] Staff. Remember, IES is NOT to be used for urgent needs. For medical emergencies, dial 911.     Vi dark, loose bowel movements           Blood Producing Medications     Vitamin B-12 (VITAMIN B12) 500 MCG Oral Tab       Use: Increase blood cell counts   Most common side effects: Pain, fever, rash, fatigue, joint pain, blood clots, high blood pressure   W